# Patient Record
Sex: FEMALE | Race: WHITE | NOT HISPANIC OR LATINO | URBAN - METROPOLITAN AREA
[De-identification: names, ages, dates, MRNs, and addresses within clinical notes are randomized per-mention and may not be internally consistent; named-entity substitution may affect disease eponyms.]

---

## 2021-03-31 DIAGNOSIS — Z23 ENCOUNTER FOR IMMUNIZATION: ICD-10-CM

## 2023-07-24 ENCOUNTER — OFFICE VISIT (OUTPATIENT)
Dept: URGENT CARE | Age: 68
End: 2023-07-24
Payer: MEDICARE

## 2023-07-24 VITALS
OXYGEN SATURATION: 99 % | DIASTOLIC BLOOD PRESSURE: 74 MMHG | TEMPERATURE: 97.1 F | HEART RATE: 76 BPM | RESPIRATION RATE: 18 BRPM | SYSTOLIC BLOOD PRESSURE: 122 MMHG

## 2023-07-24 DIAGNOSIS — H65.91 RIGHT NON-SUPPURATIVE OTITIS MEDIA: Primary | ICD-10-CM

## 2023-07-24 PROCEDURE — G0463 HOSPITAL OUTPT CLINIC VISIT: HCPCS

## 2023-07-24 PROCEDURE — 99213 OFFICE O/P EST LOW 20 MIN: CPT

## 2023-07-24 RX ORDER — CETIRIZINE HYDROCHLORIDE 10 MG/1
10 TABLET ORAL DAILY
COMMUNITY
Start: 2023-04-28

## 2023-07-24 RX ORDER — AMOXICILLIN 500 MG/1
500 CAPSULE ORAL EVERY 12 HOURS SCHEDULED
Qty: 20 CAPSULE | Refills: 0 | Status: SHIPPED | OUTPATIENT
Start: 2023-07-24 | End: 2023-08-03

## 2023-07-24 RX ORDER — FLUTICASONE PROPIONATE 50 MCG
2 SPRAY, SUSPENSION (ML) NASAL DAILY PRN
COMMUNITY
Start: 2023-03-14

## 2023-07-24 NOTE — PROGRESS NOTES
[facility]        NAME: Scott Aguirre is a 76 y.o. female  : 1955    MRN: [id number]  DATE: 2023  TIME: 10:37 AM    Assessment and Plan   Right non-suppurative otitis media [H65.91]  1. Right non-suppurative otitis media  amoxicillin (AMOXIL) 500 mg capsule        Patient presents for eval of right ear pain that started progressively getting worse last night. Denies fevers, denies drainage. Assessment notes right OM. Bulging and erythema. Patient Instructions       Follow up with PCP as needed    Chief Complaint     Chief Complaint   Patient presents with   • Earache     Patient relates right ear pain starting last PM. States started out of nowhere         History of Present Illness       Patient presents for eval of right ear pain that started progressively getting worse last night. Denies fevers, denies drainage. Assessment notes right OM. Bulging and erythema. Review of Systems   Review of Systems   HENT: Positive for ear pain. Negative for ear discharge. All other systems reviewed and are negative. Current Medications       Current Outpatient Medications:   •  amoxicillin (AMOXIL) 500 mg capsule, Take 1 capsule (500 mg total) by mouth every 12 (twelve) hours for 10 days, Disp: 20 capsule, Rfl: 0  •  fluticasone (FLONASE) 50 mcg/act nasal spray, 2 sprays into each nostril daily as needed, Disp: , Rfl:   •  cetirizine (ZyrTEC) 10 mg tablet, Take 10 mg by mouth daily, Disp: , Rfl:   •  Cholecalciferol 125 MCG (5000 UT) capsule, Take 5,000 Units by mouth, Disp: , Rfl:     Current Allergies     Allergies as of 2023   • (No Known Allergies)            The following portions of the patient's history were reviewed and updated as appropriate: allergies, current medications, past family history, past medical history, past social history, past surgical history and problem list.     No past medical history on file. No past surgical history on file.     No family history on file. Medications have been verified. Objective   /74   Pulse 76   Temp (!) 97.1 °F (36.2 °C)   Resp 18   SpO2 99%   No LMP recorded. Physical Exam     Physical Exam  Vitals reviewed. Constitutional:       Appearance: Normal appearance. HENT:      Right Ear: Tympanic membrane is erythematous and bulging. Left Ear: Tympanic membrane normal.   Neurological:      Mental Status: She is alert.

## 2023-12-01 ENCOUNTER — OFFICE VISIT (OUTPATIENT)
Dept: URGENT CARE | Age: 68
End: 2023-12-01
Payer: MEDICARE

## 2023-12-01 VITALS
HEART RATE: 92 BPM | SYSTOLIC BLOOD PRESSURE: 122 MMHG | RESPIRATION RATE: 18 BRPM | DIASTOLIC BLOOD PRESSURE: 78 MMHG | TEMPERATURE: 97.8 F | OXYGEN SATURATION: 98 %

## 2023-12-01 DIAGNOSIS — J01.00 ACUTE MAXILLARY SINUSITIS, RECURRENCE NOT SPECIFIED: ICD-10-CM

## 2023-12-01 DIAGNOSIS — J40 BRONCHITIS: Primary | ICD-10-CM

## 2023-12-01 DIAGNOSIS — Z20.822 ENCOUNTER FOR LABORATORY TESTING FOR COVID-19 VIRUS: ICD-10-CM

## 2023-12-01 LAB
SARS-COV-2 AG UPPER RESP QL IA: NEGATIVE
VALID CONTROL: NORMAL

## 2023-12-01 PROCEDURE — 99213 OFFICE O/P EST LOW 20 MIN: CPT | Performed by: PHYSICIAN ASSISTANT

## 2023-12-01 PROCEDURE — G0463 HOSPITAL OUTPT CLINIC VISIT: HCPCS | Performed by: PHYSICIAN ASSISTANT

## 2023-12-01 PROCEDURE — 87811 SARS-COV-2 COVID19 W/OPTIC: CPT | Performed by: PHYSICIAN ASSISTANT

## 2023-12-01 RX ORDER — DOXYCYCLINE 100 MG/1
100 TABLET ORAL 2 TIMES DAILY
Qty: 20 TABLET | Refills: 0 | Status: SHIPPED | OUTPATIENT
Start: 2023-12-01 | End: 2023-12-11

## 2023-12-01 RX ORDER — ALBUTEROL SULFATE 90 UG/1
2 AEROSOL, METERED RESPIRATORY (INHALATION) EVERY 6 HOURS PRN
Qty: 8.5 G | Refills: 0 | Status: SHIPPED | OUTPATIENT
Start: 2023-12-01

## 2023-12-01 RX ORDER — BENZONATATE 100 MG/1
100 CAPSULE ORAL 3 TIMES DAILY PRN
Qty: 20 CAPSULE | Refills: 0 | Status: SHIPPED | OUTPATIENT
Start: 2023-12-01

## 2023-12-01 NOTE — PROGRESS NOTES
[facility]        NAME: Madison Garcia is a 76 y.o. female  : 1955    MRN: [id number]  DATE: 2023  TIME: 5:41 PM    /78   Pulse 92   Temp 97.8 °F (36.6 °C)   Resp 18   SpO2 98%     Assessment and Plan   Bronchitis [J40]  1. Bronchitis  Poct Covid 19 Rapid Antigen Test    doxycycline (ADOXA) 100 MG tablet    benzonatate (TESSALON PERLES) 100 mg capsule    albuterol (ProAir HFA) 90 mcg/act inhaler      2. Encounter for laboratory testing for COVID-19 virus  doxycycline (ADOXA) 100 MG tablet    benzonatate (TESSALON PERLES) 100 mg capsule    albuterol (ProAir HFA) 90 mcg/act inhaler      3. Acute maxillary sinusitis, recurrence not specified  doxycycline (ADOXA) 100 MG tablet    benzonatate (TESSALON PERLES) 100 mg capsule    albuterol (ProAir HFA) 90 mcg/act inhaler            Patient Instructions       Follow up with PCP in 3-5 days. Proceed to  ER if symptoms worsen. Chief Complaint     Chief Complaint   Patient presents with    Cough     Patient relates for past 2 days having chest tightness and coughing up thick yellow/green mucus in the AM. Cough has been going on x2 weeks         History of Present Illness       Pt with productive cough x 2 weeks and clear nasal congestion     Cough        Review of Systems   Review of Systems   Constitutional: Negative. HENT: Negative. Eyes: Negative. Respiratory:  Positive for cough. Cardiovascular: Negative. Gastrointestinal: Negative. Endocrine: Negative. Genitourinary: Negative. Musculoskeletal: Negative. Skin: Negative. Allergic/Immunologic: Negative. Neurological: Negative. Hematological: Negative. Psychiatric/Behavioral: Negative. All other systems reviewed and are negative.         Current Medications       Current Outpatient Medications:     albuterol (ProAir HFA) 90 mcg/act inhaler, Inhale 2 puffs every 6 (six) hours as needed for wheezing, Disp: 8.5 g, Rfl: 0    benzonatate (TESSALON PERLES) 100 mg capsule, Take 1 capsule (100 mg total) by mouth 3 (three) times a day as needed for cough, Disp: 20 capsule, Rfl: 0    cetirizine (ZyrTEC) 10 mg tablet, Take 10 mg by mouth daily, Disp: , Rfl:     doxycycline (ADOXA) 100 MG tablet, Take 1 tablet (100 mg total) by mouth 2 (two) times a day for 10 days, Disp: 20 tablet, Rfl: 0    fluticasone (FLONASE) 50 mcg/act nasal spray, 2 sprays into each nostril daily as needed, Disp: , Rfl:     Cholecalciferol 125 MCG (5000 UT) capsule, Take 5,000 Units by mouth (Patient not taking: Reported on 12/1/2023), Disp: , Rfl:     Current Allergies     Allergies as of 12/01/2023    (No Known Allergies)            The following portions of the patient's history were reviewed and updated as appropriate: allergies, current medications, past family history, past medical history, past social history, past surgical history and problem list.     No past medical history on file. No past surgical history on file. No family history on file. Medications have been verified. Objective   /78   Pulse 92   Temp 97.8 °F (36.6 °C)   Resp 18   SpO2 98%        Physical Exam     Physical Exam  Vitals and nursing note reviewed. Constitutional:       Appearance: Normal appearance. She is normal weight. HENT:      Head: Normocephalic and atraumatic. Right Ear: Tympanic membrane, ear canal and external ear normal.      Left Ear: Tympanic membrane, ear canal and external ear normal.      Nose: Rhinorrhea present. Comments: Clear      Mouth/Throat:      Mouth: Mucous membranes are moist.      Pharynx: Oropharynx is clear. Eyes:      Extraocular Movements: Extraocular movements intact. Conjunctiva/sclera: Conjunctivae normal.      Pupils: Pupils are equal, round, and reactive to light. Cardiovascular:      Rate and Rhythm: Normal rate and regular rhythm. Pulses: Normal pulses. Heart sounds: Normal heart sounds.    Pulmonary: Effort: Pulmonary effort is normal.      Comments: Minor coarse sounds all fields   Abdominal:      General: Abdomen is flat. Bowel sounds are normal.      Palpations: Abdomen is soft. Musculoskeletal:         General: Normal range of motion. Cervical back: Normal range of motion and neck supple. Skin:     General: Skin is warm. Capillary Refill: Capillary refill takes less than 2 seconds. Neurological:      General: No focal deficit present. Mental Status: She is alert and oriented to person, place, and time.    Psychiatric:         Mood and Affect: Mood normal.

## 2024-08-12 ENCOUNTER — OFFICE VISIT (OUTPATIENT)
Dept: URGENT CARE | Age: 69
End: 2024-08-12
Payer: MEDICARE

## 2024-08-12 VITALS
TEMPERATURE: 99.5 F | RESPIRATION RATE: 18 BRPM | SYSTOLIC BLOOD PRESSURE: 132 MMHG | BODY MASS INDEX: 26.09 KG/M2 | HEIGHT: 62 IN | DIASTOLIC BLOOD PRESSURE: 80 MMHG | OXYGEN SATURATION: 98 % | WEIGHT: 141.8 LBS | HEART RATE: 77 BPM

## 2024-08-12 DIAGNOSIS — R05.1 ACUTE COUGH: Primary | ICD-10-CM

## 2024-08-12 PROCEDURE — 99213 OFFICE O/P EST LOW 20 MIN: CPT | Performed by: FAMILY MEDICINE

## 2024-08-12 PROCEDURE — G0463 HOSPITAL OUTPT CLINIC VISIT: HCPCS | Performed by: FAMILY MEDICINE

## 2024-08-12 RX ORDER — BENZONATATE 200 MG/1
200 CAPSULE ORAL 3 TIMES DAILY PRN
Qty: 20 CAPSULE | Refills: 0 | Status: SHIPPED | OUTPATIENT
Start: 2024-08-12

## 2024-08-12 RX ORDER — ROSUVASTATIN CALCIUM 5 MG/1
5 TABLET, COATED ORAL DAILY
COMMUNITY

## 2024-08-12 NOTE — PATIENT INSTRUCTIONS
Saline nasal  spray as often as needed in each nostril  Continue with flonase nasal spray to each nostril daily.  Mucinex as directed  Tessalon pearls as need for cough

## 2024-08-12 NOTE — PROGRESS NOTES
[facility]        NAME: Lyn Soto is a 69 y.o. female  : 1955    MRN: [id number]  DATE: 2024  TIME: 7:29 PM    Assessment and Plan   Acute cough [R05.1]  1. Acute cough  benzonatate (TESSALON) 200 MG capsule            Patient Instructions   Saline nasal  spray as often as needed in each nostril  Continue with flonase nasal spray to each nostril daily.  Mucinex as directed  Tessalon pearls as need for cough    Follow up with PCP in 3-5 days.  Proceed to  ER if symptoms worsen.    If tests have been performed at [facility], our office will contact you with results if changes need to be made to the care plan discussed with you at the visit.  [facility]    Chief Complaint     Chief Complaint   Patient presents with    Nasal Congestion    Cough     Patient presents with complaints of nasal congestion and productive cough for about 7 days. Has been taking OTC medications with no relief.         History of Present Illness       This is a 69 year old female who presents with nasal congestion, dry cough, PND and increase mucous in the morning.  She states her  is sick at home also.  She returned from [place] a week ago and that is when her symptoms started.  She had a negative covid test when it started. She denies any SOB, CP or fever.  She denies chills or fever  She has been taking dayqwuill and nyquil and mucinex as needed with no relief    Cough  Associated symptoms include postnasal drip. Pertinent negatives include no chills, fever, headaches, sore throat or shortness of breath.       Review of Systems   Review of Systems   Constitutional: Negative.  Negative for chills, fatigue and fever.   HENT:  Positive for congestion and postnasal drip. Negative for sore throat.    Respiratory:  Positive for cough. Negative for shortness of breath.    Cardiovascular: Negative.    Genitourinary: Negative.    Musculoskeletal: Negative.   "  Neurological:  Negative for headaches.         Current Medications       Current Outpatient Medications:     benzonatate (TESSALON) 200 MG capsule, Take 1 capsule (200 mg total) by mouth 3 (three) times a day as needed for cough, Disp: 20 capsule, Rfl: 0    albuterol (ProAir HFA) 90 mcg/act inhaler, Inhale 2 puffs every 6 (six) hours as needed for wheezing, Disp: 8.5 g, Rfl: 0    benzonatate (TESSALON PERLES) 100 mg capsule, Take 1 capsule (100 mg total) by mouth 3 (three) times a day as needed for cough, Disp: 20 capsule, Rfl: 0    cetirizine (ZyrTEC) 10 mg tablet, Take 10 mg by mouth daily, Disp: , Rfl:     Cholecalciferol 125 MCG (5000 UT) capsule, Take 5,000 Units by mouth (Patient not taking: Reported on 12/1/2023), Disp: , Rfl:     fluticasone (FLONASE) 50 mcg/act nasal spray, 2 sprays into each nostril daily as needed, Disp: , Rfl:     rosuvastatin (CRESTOR) 5 mg tablet, Take 5 mg by mouth daily, Disp: , Rfl:     Current Allergies     Allergies as of 08/12/2024 - Reviewed 08/12/2024   Allergen Reaction Noted    Pollen extract Allergic Rhinitis 04/16/2019            The following portions of the patient's history were reviewed and updated as appropriate: allergies, current medications, past family history, past medical history, past social history, past surgical history and problem list.     History reviewed. No pertinent past medical history.    History reviewed. No pertinent surgical history.    History reviewed. No pertinent family history.      Medications have been verified.        Objective   /80   Pulse 77   Temp 99.5 °F (37.5 °C) (Tympanic)   Resp 18   Ht 5' 2\" (1.575 m)   Wt 64.3 kg (141 lb 12.8 oz)   SpO2 98%   BMI 25.94 kg/m²   No LMP recorded.       Physical Exam     Physical Exam  Vitals reviewed.   Constitutional:       General: She is not in acute distress.     Appearance: Normal appearance. She is not ill-appearing.   HENT:      Head: Normocephalic and atraumatic.      Right Ear: " Tympanic membrane, ear canal and external ear normal.      Left Ear: Tympanic membrane, ear canal and external ear normal.      Nose: Congestion present. No rhinorrhea.      Mouth/Throat:      Pharynx: Posterior oropharyngeal erythema present.   Eyes:      Extraocular Movements: Extraocular movements intact.      Conjunctiva/sclera: Conjunctivae normal.   Cardiovascular:      Rate and Rhythm: Normal rate and regular rhythm.      Pulses: Normal pulses.      Heart sounds: Normal heart sounds.   Pulmonary:      Effort: Pulmonary effort is normal.   Abdominal:      General: Abdomen is flat. Bowel sounds are normal.   Musculoskeletal:         General: Normal range of motion.      Cervical back: Normal range of motion and neck supple.   Lymphadenopathy:      Cervical: No cervical adenopathy.   Skin:     General: Skin is warm.   Neurological:      General: No focal deficit present.      Mental Status: She is alert.   Psychiatric:         Mood and Affect: Mood normal.         Behavior: Behavior normal.         Judgment: Judgment normal.